# Patient Record
Sex: MALE | Race: WHITE | NOT HISPANIC OR LATINO | Employment: FULL TIME | URBAN - NONMETROPOLITAN AREA
[De-identification: names, ages, dates, MRNs, and addresses within clinical notes are randomized per-mention and may not be internally consistent; named-entity substitution may affect disease eponyms.]

---

## 2018-06-10 ENCOUNTER — HOSPITAL ENCOUNTER (EMERGENCY)
Facility: HOSPITAL | Age: 33
Discharge: HOME | End: 2018-06-10
Attending: EMERGENCY MEDICINE | Admitting: EMERGENCY MEDICINE

## 2018-06-10 ENCOUNTER — APPOINTMENT (OUTPATIENT)
Dept: CT IMAGING | Facility: HOSPITAL | Age: 33
End: 2018-06-10

## 2018-06-10 VITALS
BODY MASS INDEX: 36.06 KG/M2 | TEMPERATURE: 98.5 F | DIASTOLIC BLOOD PRESSURE: 82 MMHG | WEIGHT: 266.2 LBS | HEIGHT: 72 IN | RESPIRATION RATE: 18 BRPM | SYSTOLIC BLOOD PRESSURE: 128 MMHG | OXYGEN SATURATION: 98 % | HEART RATE: 71 BPM

## 2018-06-10 DIAGNOSIS — R10.84 GENERALIZED ABDOMINAL PAIN: Primary | ICD-10-CM

## 2018-06-10 DIAGNOSIS — K62.5 RECTAL BLEEDING: ICD-10-CM

## 2018-06-10 LAB
ALBUMIN SERPL-MCNC: 4.4 G/DL (ref 3.5–5)
ALBUMIN/GLOB SERPL: 1.5 G/DL (ref 1–2)
ALP SERPL-CCNC: 52 U/L (ref 38–126)
ALT SERPL W P-5'-P-CCNC: 47 U/L (ref 13–69)
ANION GAP SERPL CALCULATED.3IONS-SCNC: 15.7 MMOL/L (ref 10–20)
AST SERPL-CCNC: 32 U/L (ref 15–46)
BASOPHILS # BLD AUTO: 0.04 10*3/MM3 (ref 0–0.2)
BASOPHILS NFR BLD AUTO: 0.4 % (ref 0–2.5)
BILIRUB SERPL-MCNC: 0.5 MG/DL (ref 0.2–1.3)
BILIRUB UR QL STRIP: NEGATIVE
BUN BLD-MCNC: 14 MG/DL (ref 7–20)
BUN/CREAT SERPL: 11.7 (ref 6.3–21.9)
CALCIUM SPEC-SCNC: 9.6 MG/DL (ref 8.4–10.2)
CHLORIDE SERPL-SCNC: 102 MMOL/L (ref 98–107)
CLARITY UR: CLEAR
CO2 SERPL-SCNC: 29 MMOL/L (ref 26–30)
COLOR UR: YELLOW
CREAT BLD-MCNC: 1.2 MG/DL (ref 0.6–1.3)
DEPRECATED RDW RBC AUTO: 40.4 FL (ref 37–54)
EOSINOPHIL # BLD AUTO: 0.23 10*3/MM3 (ref 0–0.7)
EOSINOPHIL NFR BLD AUTO: 2.3 % (ref 0–7)
ERYTHROCYTE [DISTWIDTH] IN BLOOD BY AUTOMATED COUNT: 12 % (ref 11.5–14.5)
GFR SERPL CREATININE-BSD FRML MDRD: 70 ML/MIN/1.73
GLOBULIN UR ELPH-MCNC: 2.9 GM/DL
GLUCOSE BLD-MCNC: 95 MG/DL (ref 74–98)
GLUCOSE UR STRIP-MCNC: NEGATIVE MG/DL
HCT VFR BLD AUTO: 42.2 % (ref 42–52)
HGB BLD-MCNC: 15 G/DL (ref 14–18)
HGB UR QL STRIP.AUTO: NEGATIVE
HOLD SPECIMEN: NORMAL
HOLD SPECIMEN: NORMAL
IMM GRANULOCYTES # BLD: 0.02 10*3/MM3 (ref 0–0.06)
IMM GRANULOCYTES NFR BLD: 0.2 % (ref 0–0.6)
KETONES UR QL STRIP: NEGATIVE
LEUKOCYTE ESTERASE UR QL STRIP.AUTO: NEGATIVE
LIPASE SERPL-CCNC: 84 U/L (ref 23–300)
LYMPHOCYTES # BLD AUTO: 3.88 10*3/MM3 (ref 0.6–3.4)
LYMPHOCYTES NFR BLD AUTO: 38.8 % (ref 10–50)
MCH RBC QN AUTO: 32.6 PG (ref 27–31)
MCHC RBC AUTO-ENTMCNC: 35.5 G/DL (ref 30–37)
MCV RBC AUTO: 91.7 FL (ref 80–94)
MONOCYTES # BLD AUTO: 1.06 10*3/MM3 (ref 0–0.9)
MONOCYTES NFR BLD AUTO: 10.6 % (ref 0–12)
NEUTROPHILS # BLD AUTO: 4.78 10*3/MM3 (ref 2–6.9)
NEUTROPHILS NFR BLD AUTO: 47.7 % (ref 37–80)
NITRITE UR QL STRIP: NEGATIVE
NRBC BLD MANUAL-RTO: 0 /100 WBC (ref 0–0)
PH UR STRIP.AUTO: 6 [PH] (ref 5–8)
PLATELET # BLD AUTO: 305 10*3/MM3 (ref 130–400)
PMV BLD AUTO: 9.9 FL (ref 6–12)
POTASSIUM BLD-SCNC: 3.7 MMOL/L (ref 3.5–5.1)
PROT SERPL-MCNC: 7.3 G/DL (ref 6.3–8.2)
PROT UR QL STRIP: NEGATIVE
RBC # BLD AUTO: 4.6 10*6/MM3 (ref 4.7–6.1)
SODIUM BLD-SCNC: 143 MMOL/L (ref 137–145)
SP GR UR STRIP: 1.02 (ref 1–1.03)
TROPONIN I SERPL-MCNC: <0.012 NG/ML (ref 0–0.03)
UROBILINOGEN UR QL STRIP: ABNORMAL
WBC NRBC COR # BLD: 10.01 10*3/MM3 (ref 4.8–10.8)
WHOLE BLOOD HOLD SPECIMEN: NORMAL
WHOLE BLOOD HOLD SPECIMEN: NORMAL

## 2018-06-10 PROCEDURE — 84484 ASSAY OF TROPONIN QUANT: CPT | Performed by: EMERGENCY MEDICINE

## 2018-06-10 PROCEDURE — 85025 COMPLETE CBC W/AUTO DIFF WBC: CPT | Performed by: EMERGENCY MEDICINE

## 2018-06-10 PROCEDURE — 81003 URINALYSIS AUTO W/O SCOPE: CPT | Performed by: EMERGENCY MEDICINE

## 2018-06-10 PROCEDURE — 0 DIATRIZOATE MEGLUMINE & SODIUM PER 1 ML: Performed by: EMERGENCY MEDICINE

## 2018-06-10 PROCEDURE — 80053 COMPREHEN METABOLIC PANEL: CPT | Performed by: EMERGENCY MEDICINE

## 2018-06-10 PROCEDURE — 93005 ELECTROCARDIOGRAM TRACING: CPT | Performed by: EMERGENCY MEDICINE

## 2018-06-10 PROCEDURE — 25010000002 IOPAMIDOL 61 % SOLUTION: Performed by: EMERGENCY MEDICINE

## 2018-06-10 PROCEDURE — 99284 EMERGENCY DEPT VISIT MOD MDM: CPT

## 2018-06-10 PROCEDURE — 83690 ASSAY OF LIPASE: CPT | Performed by: EMERGENCY MEDICINE

## 2018-06-10 PROCEDURE — 74177 CT ABD & PELVIS W/CONTRAST: CPT

## 2018-06-10 RX ORDER — NEBIVOLOL 10 MG/1
10 TABLET ORAL DAILY
COMMUNITY

## 2018-06-10 RX ORDER — PHENTERMINE HYDROCHLORIDE 37.5 MG/1
37.5 TABLET ORAL
COMMUNITY

## 2018-06-10 RX ADMIN — IOPAMIDOL 100 ML: 612 INJECTION, SOLUTION INTRAVENOUS at 03:21

## 2018-06-10 RX ADMIN — DIATRIZOATE MEGLUMINE AND DIATRIZOATE SODIUM 30 ML: 660; 100 LIQUID ORAL; RECTAL at 03:21

## 2018-06-10 RX ADMIN — SODIUM CHLORIDE 1000 ML: 9 INJECTION, SOLUTION INTRAVENOUS at 01:47

## 2018-06-16 ENCOUNTER — OFFICE VISIT (OUTPATIENT)
Dept: FAMILY MEDICINE CLINIC | Age: 33
End: 2018-06-16
Payer: MEDICAID

## 2018-06-16 VITALS
RESPIRATION RATE: 18 BRPM | WEIGHT: 264.8 LBS | DIASTOLIC BLOOD PRESSURE: 94 MMHG | SYSTOLIC BLOOD PRESSURE: 138 MMHG | BODY MASS INDEX: 35.09 KG/M2 | OXYGEN SATURATION: 98 % | HEIGHT: 73 IN | HEART RATE: 114 BPM

## 2018-06-16 DIAGNOSIS — M10.9 ACUTE GOUT OF LEFT FOOT, UNSPECIFIED CAUSE: ICD-10-CM

## 2018-06-16 DIAGNOSIS — Z13.220 LIPID SCREENING: ICD-10-CM

## 2018-06-16 DIAGNOSIS — I10 ESSENTIAL HYPERTENSION: ICD-10-CM

## 2018-06-16 DIAGNOSIS — M10.9 ACUTE GOUT OF LEFT ELBOW, UNSPECIFIED CAUSE: Primary | ICD-10-CM

## 2018-06-16 PROBLEM — M1A.9XX0 CHRONIC GOUT: Status: ACTIVE | Noted: 2018-06-16

## 2018-06-16 PROBLEM — M1A.9XX0 CHRONIC GOUT: Chronic | Status: ACTIVE | Noted: 2018-06-16

## 2018-06-16 PROBLEM — E66.09 OBESITY DUE TO EXCESS CALORIES: Status: ACTIVE | Noted: 2018-06-16

## 2018-06-16 PROBLEM — Z72.0 TOBACCO USE: Status: ACTIVE | Noted: 2018-06-16

## 2018-06-16 PROCEDURE — 99202 OFFICE O/P NEW SF 15 MIN: CPT | Performed by: NURSE PRACTITIONER

## 2018-06-16 PROCEDURE — 96372 THER/PROPH/DIAG INJ SC/IM: CPT | Performed by: NURSE PRACTITIONER

## 2018-06-16 RX ORDER — PHENTERMINE HYDROCHLORIDE 37.5 MG/1
37.5 TABLET ORAL DAILY
COMMUNITY
End: 2018-10-20

## 2018-06-16 RX ORDER — DEXAMETHASONE SODIUM PHOSPHATE 4 MG/ML
10 INJECTION, SOLUTION INTRA-ARTICULAR; INTRALESIONAL; INTRAMUSCULAR; INTRAVENOUS; SOFT TISSUE ONCE
Status: COMPLETED | OUTPATIENT
Start: 2018-06-16 | End: 2018-06-16

## 2018-06-16 RX ADMIN — DEXAMETHASONE SODIUM PHOSPHATE 10 MG: 4 INJECTION, SOLUTION INTRA-ARTICULAR; INTRALESIONAL; INTRAMUSCULAR; INTRAVENOUS; SOFT TISSUE at 11:56

## 2018-06-16 ASSESSMENT — ENCOUNTER SYMPTOMS
ABDOMINAL DISTENTION: 0
EYES NEGATIVE: 1
ABDOMINAL PAIN: 0

## 2018-06-24 ASSESSMENT — ENCOUNTER SYMPTOMS
ALLERGIC/IMMUNOLOGIC NEGATIVE: 1
CHEST TIGHTNESS: 0
CHOKING: 0
SHORTNESS OF BREATH: 0

## 2018-07-06 ENCOUNTER — OFFICE VISIT (OUTPATIENT)
Dept: SURGERY | Facility: CLINIC | Age: 33
End: 2018-07-06

## 2018-07-06 VITALS
TEMPERATURE: 97.7 F | WEIGHT: 267.8 LBS | HEIGHT: 72 IN | DIASTOLIC BLOOD PRESSURE: 98 MMHG | BODY MASS INDEX: 36.27 KG/M2 | SYSTOLIC BLOOD PRESSURE: 132 MMHG | HEART RATE: 103 BPM | OXYGEN SATURATION: 98 %

## 2018-07-06 DIAGNOSIS — R10.11 RIGHT UPPER QUADRANT ABDOMINAL PAIN: ICD-10-CM

## 2018-07-06 DIAGNOSIS — K62.5 RECTAL BLEED: Primary | ICD-10-CM

## 2018-07-06 DIAGNOSIS — K52.9 CHRONIC DIARRHEA: ICD-10-CM

## 2018-07-06 PROCEDURE — 99204 OFFICE O/P NEW MOD 45 MIN: CPT | Performed by: SURGERY

## 2018-07-06 RX ORDER — CHOLESTYRAMINE 4 G/9G
1 POWDER, FOR SUSPENSION ORAL
Qty: 30 PACKET | Refills: 6 | Status: SHIPPED | OUTPATIENT
Start: 2018-07-06

## 2018-07-06 NOTE — PROGRESS NOTES
Patient: Jett Madrid    YOB: 1985    Date: 07/06/2018    Primary Care Provider: Arik Rodney DO    Chief Complaint   Patient presents with   • Rectal Bleeding       Subjective .     History of present illness:  I saw the patient in the office today as a consultation for evaluation and treatment of rectal bleeding described as BRBPR in the toilet bowl that occurred one time.  He has had RUQ pain for the past 4 years that radiates into his RLQ occasionally.  He has nausea, vomiting, and diarrhea alternating with constipation.  He had a ct scan that showed lymphadenopathy.  Patient had a negative gallbladder ultrasound and HIDA scan when he had pain with the Kinevac injection as well as nausea.  Patient has significant diarrhea after eating is concerned about dumping syndrome.  Recent CT scan was unremarkable.  No previous EGD or colonoscopy.    The following portions of the patient's history were reviewed and updated as appropriate: allergies, current medications, past family history, past medical history, past social history, past surgical history and problem list.      Review of Systems   Constitutional: Negative for chills, fever and unexpected weight change.   HENT: Negative for trouble swallowing and voice change.    Eyes: Negative for visual disturbance.   Respiratory: Negative for apnea, cough, chest tightness, shortness of breath and wheezing.    Cardiovascular: Negative for chest pain, palpitations and leg swelling.   Gastrointestinal: Positive for abdominal pain, constipation, diarrhea, nausea and vomiting. Negative for abdominal distention, anal bleeding, blood in stool and rectal pain.   Endocrine: Negative for cold intolerance and heat intolerance.   Genitourinary: Negative for difficulty urinating, dysuria, flank pain, scrotal swelling and testicular pain.   Musculoskeletal: Negative for back pain, gait problem and joint swelling.   Skin: Negative for color change, rash and wound.  "  Neurological: Negative for dizziness, syncope, speech difficulty, weakness, numbness and headaches.   Hematological: Negative for adenopathy. Does not bruise/bleed easily.   Psychiatric/Behavioral: Negative for confusion. The patient is not nervous/anxious.        History:  Past Medical History:   Diagnosis Date   • Hypertension    • Stomach pain     intermittent stomach pain for years but never saw a MD       Past Surgical History:   Procedure Laterality Date   • DENTAL PROCEDURE     • ELBOW PROCEDURE      left elbow sx   • TONSILLECTOMY         Family History   Problem Relation Age of Onset   • Diabetes Mother    • Diabetes Father    • Cancer Father    • Cancer Paternal Uncle        Social History   Substance Use Topics   • Smoking status: Never Smoker   • Smokeless tobacco: Current User     Types: Chew      Comment: 1 can of chew daily   • Alcohol use No       Allergies:  No Known Allergies    Medications:    Current Outpatient Prescriptions:   •  nebivolol (BYSTOLIC) 10 MG tablet, Take 10 mg by mouth Daily., Disp: , Rfl:   •  phentermine (ADIPEX-P) 37.5 MG tablet, Take 37.5 mg by mouth Every Morning Before Breakfast., Disp: , Rfl:     Objective     Vital Signs:   Vitals:    07/06/18 1240   BP: 132/98   Pulse: 103   Temp: 97.7 °F (36.5 °C)   TempSrc: Temporal Artery    SpO2: 98%   Weight: 121 kg (267 lb 12.8 oz)   Height: 182.9 cm (72\")       Physical Exam:   General Appearance:    Alert, cooperative, in no acute distress   Head:    Normocephalic, without obvious abnormality, atraumatic   Eyes:            Lids and lashes normal, conjunctivae and sclerae normal, no   icterus, no pallor, corneas clear, PERRL   Ears:    Ears appear intact with no abnormalities noted   Throat:   No oral lesions, no thrush, oral mucosa moist   Neck:   No adenopathy, supple, trachea midline, no thyromegaly,  no JVD   Lungs:     Clear to auscultation,respirations regular, even and                  unlabored    Heart:    Regular rhythm " and normal rate, normal S1 and S2, no            murmur   Abdomen:     no masses, no organomegaly, soft non-tender, non-distended, no guarding, there is no evidence of tenderness   Extremities:   Moves all extremities well, no edema, no cyanosis, no             redness   Pulses:   Pulses palpable and equal bilaterally   Skin:   No bleeding, bruising or rash   Lymph nodes:   No palpable adenopathy   Neurologic:   Cranial nerves 2 - 12 grossly intact, sensation intact      Results Review:   I reviewed the patient's new clinical results.    Review of Systems was reviewed and confirmed as accurate today.    Assessment/Plan :    1. Rectal bleed    2. Chronic diarrhea    3. Right upper quadrant abdominal pain        I recommend a colonoscopy And EGD for further evaluation. The procedure was explained as well as the risks which include but are not limited to bleeding, infection, perforation, abdominal pain etc. The patient understands these risks and the procedure and wishes to proceed.  If negative, may revisit his gallbladder issues and continue workup.  Also add Questran to limit his dumping symptoms and diarrhea.    Electronically signed by Sera Novoa MD  07/06/18 10:22 AM    Portions of this note have been scribed for Sera Novoa MD by Berkley Leigh. 7/6/2018  1:06 PM

## 2018-07-23 ENCOUNTER — TELEPHONE (OUTPATIENT)
Dept: SURGERY | Facility: CLINIC | Age: 33
End: 2018-07-23

## 2018-07-23 RX ORDER — POLYETHYLENE GLYCOL 3350 17 G/17G
255 POWDER, FOR SOLUTION ORAL ONCE
Qty: 15 PACKET | Refills: 0 | Status: SHIPPED | OUTPATIENT
Start: 2018-07-23 | End: 2018-07-23

## 2018-07-23 RX ORDER — BISACODYL 5 MG/1
TABLET, DELAYED RELEASE ORAL
Qty: 4 TABLET | Refills: 0 | Status: SHIPPED | OUTPATIENT
Start: 2018-07-23 | End: 2018-08-03

## 2018-07-23 NOTE — TELEPHONE ENCOUNTER
Called patient to confirm patient surgery with Dr Novoa  07/24/18 no answer, left voice message for patient to call office and confirm surgery.

## 2018-07-24 ENCOUNTER — OUTSIDE FACILITY SERVICE (OUTPATIENT)
Dept: SURGERY | Facility: CLINIC | Age: 33
End: 2018-07-24

## 2018-07-24 PROCEDURE — G0121 COLON CA SCRN NOT HI RSK IND: HCPCS | Performed by: SURGERY

## 2018-07-24 PROCEDURE — 43239 EGD BIOPSY SINGLE/MULTIPLE: CPT | Performed by: SURGERY

## 2018-08-02 ENCOUNTER — TELEPHONE (OUTPATIENT)
Dept: SURGERY | Facility: CLINIC | Age: 33
End: 2018-08-02

## 2018-08-03 ENCOUNTER — OFFICE VISIT (OUTPATIENT)
Dept: SURGERY | Facility: CLINIC | Age: 33
End: 2018-08-03

## 2018-08-03 VITALS
TEMPERATURE: 98.2 F | OXYGEN SATURATION: 99 % | DIASTOLIC BLOOD PRESSURE: 90 MMHG | HEIGHT: 72 IN | HEART RATE: 89 BPM | SYSTOLIC BLOOD PRESSURE: 130 MMHG | WEIGHT: 267 LBS | BODY MASS INDEX: 36.16 KG/M2

## 2018-08-03 DIAGNOSIS — K21.00 GASTROESOPHAGEAL REFLUX DISEASE WITH ESOPHAGITIS: ICD-10-CM

## 2018-08-03 DIAGNOSIS — R10.11 RUQ PAIN: Primary | ICD-10-CM

## 2018-08-03 PROCEDURE — 99213 OFFICE O/P EST LOW 20 MIN: CPT | Performed by: SURGERY

## 2018-08-03 RX ORDER — OMEPRAZOLE 40 MG/1
40 CAPSULE, DELAYED RELEASE ORAL DAILY
Qty: 30 CAPSULE | Refills: 11 | Status: SHIPPED | OUTPATIENT
Start: 2018-08-03

## 2018-08-03 NOTE — PROGRESS NOTES
Patient: Jett Madrid    YOB: 1985    Date: 08/03/2018    Primary Care Provider: Arik Rodney DO    Reason for Consultation: Follow-up EGD    Chief Complaint   Patient presents with   • Follow-up     follow up egd colon       History of present illness:  I saw the patient in the office today as a followup from their recent EGD/colonoscopy with biopsy, the pathology report did show minimal chronic inflammation,gastroesophageal reflux,eosinophils per high magnification field. They complain of a constant dull ache @ RUQ. Ultrasound today shows no gallstones, last HIDA scan was 3 years ago and was equivocal.  Patient has daily pain and discomfort with severe nausea and pain radiating to his back, pain at ×8 out of 10.  Patient also has significant inflammation and reflux, does not take PPI medication.    The following portions of the patient's history were reviewed and updated as appropriate: allergies, current medications, past family history, past medical history, past social history, past surgical history and problem list.      Review of Systems   Constitutional: Negative for chills, fever and unexpected weight change.   HENT: Negative for trouble swallowing and voice change.    Eyes: Negative for visual disturbance.   Respiratory: Negative for apnea, cough, chest tightness, shortness of breath and wheezing.    Cardiovascular: Negative for chest pain, palpitations and leg swelling.   Gastrointestinal: Negative for abdominal distention, abdominal pain, anal bleeding, blood in stool, constipation, diarrhea, nausea, rectal pain and vomiting.   Endocrine: Negative for cold intolerance and heat intolerance.   Genitourinary: Negative for difficulty urinating, dysuria, flank pain, scrotal swelling and testicular pain.   Musculoskeletal: Negative for back pain, gait problem and joint swelling.   Skin: Negative for color change, rash and wound.   Neurological: Negative for dizziness, syncope, speech  "difficulty, weakness, numbness and headaches.   Hematological: Negative for adenopathy. Does not bruise/bleed easily.   Psychiatric/Behavioral: Negative for confusion. The patient is not nervous/anxious.        Vital Signs:   Vitals:    08/03/18 1359   BP: 130/90   Pulse: 89   Temp: 98.2 °F (36.8 °C)   TempSrc: Temporal Artery    SpO2: 99%   Weight: 121 kg (267 lb)   Height: 182.9 cm (72\")       Allergies:  No Known Allergies    Medications:    Current Outpatient Prescriptions:   •  cholestyramine (QUESTRAN) 4 g packet, Take 1 packet by mouth 3 (Three) Times a Day With Meals., Disp: 30 packet, Rfl: 6  •  nebivolol (BYSTOLIC) 10 MG tablet, Take 10 mg by mouth Daily., Disp: , Rfl:   •  omeprazole (PRILOSEC) 40 MG capsule, Take 1 capsule by mouth Daily., Disp: 30 capsule, Rfl: 11  •  phentermine (ADIPEX-P) 37.5 MG tablet, Take 37.5 mg by mouth Every Morning Before Breakfast., Disp: , Rfl:     Physical Exam:   General Appearance:    Alert, cooperative, in no acute distress   Abdomen:     no masses, no organomegaly, soft non-tender, non-distended, no guarding, wounds are well healed, no evidence of recurrent hernia   Chest:      Clear toausculation            Cor:  Regular rate and rhythm      Results Review:   I reviewed the patient's new clinical results.    Assessment / Plan:    1. RUQ pain    2. Gastroesophageal reflux disease with esophagitis        I did discuss the situation with the patient today in the office and they have done well from their recent EGD with biopsy. I have told the patient We will start him on Prilosec daily, repeat EGD in 1 year.  Gallbladder ultrasound today was negative, he is scheduled for HIDA scan and follow-up in one week..    Electronically signed by Srea Novoa MD  08/03/18          Portions of this note have been scribed for Sera Novoa MD by Berkley Leigh. 8/3/2018  2:47 PM          "

## 2018-08-08 ENCOUNTER — TELEPHONE (OUTPATIENT)
Dept: SURGERY | Facility: CLINIC | Age: 33
End: 2018-08-08

## 2018-08-08 NOTE — TELEPHONE ENCOUNTER
Patient called me back stated he thought his hida scan was 08/09/18 inflammatory bowel disease told him that it was scheduled for yesterday. He asks if I could get it rescheduled and if possible for tomorrow. Called [facility] and   rescheduled hida scan for 08/09/18 @ 8:30 am. Called patient no answer. Called patient wife and she said she will give patient message. Patient had asks if he could be seen in [place] next Friday .

## 2018-08-08 NOTE — TELEPHONE ENCOUNTER
Called the patient to remind them of an upcoming appointment with general surgery. I was unable to reach to reach the patient.Had leave message.

## 2018-08-09 ENCOUNTER — HOSPITAL ENCOUNTER (OUTPATIENT)
Dept: NUCLEAR MEDICINE | Facility: HOSPITAL | Age: 33
Discharge: HOME | End: 2018-08-09
Payer: MEDICAID

## 2018-08-09 DIAGNOSIS — R10.11 ABDOMINAL PAIN, RIGHT UPPER QUADRANT: ICD-10-CM

## 2018-08-09 PROCEDURE — 3430000000 HC RX DIAGNOSTIC RADIOPHARMACEUTICAL: Performed by: SURGERY

## 2018-08-09 PROCEDURE — 6360000004 HC RX CONTRAST MEDICATION: Performed by: SURGERY

## 2018-08-09 PROCEDURE — A9537 TC99M MEBROFENIN: HCPCS | Performed by: SURGERY

## 2018-08-09 PROCEDURE — 78227 HEPATOBIL SYST IMAGE W/DRUG: CPT

## 2018-08-09 RX ADMIN — SINCALIDE 2.36 MCG: 5 INJECTION, POWDER, LYOPHILIZED, FOR SOLUTION INTRAVENOUS at 10:02

## 2018-08-09 RX ADMIN — MEBROFENIN 6.7 MILLICURIE: 45 INJECTION, POWDER, LYOPHILIZED, FOR SOLUTION INTRAVENOUS at 09:17

## 2018-08-16 ENCOUNTER — TELEPHONE (OUTPATIENT)
Dept: SURGERY | Facility: CLINIC | Age: 33
End: 2018-08-16

## 2018-08-16 NOTE — TELEPHONE ENCOUNTER
Called the patient to remind them of an upcoming appointment with general surgery. I was able to reach to reach the patient. Left message

## 2018-08-23 ENCOUNTER — OFFICE VISIT (OUTPATIENT)
Dept: SURGERY | Facility: CLINIC | Age: 33
End: 2018-08-23

## 2018-08-23 VITALS
TEMPERATURE: 97.2 F | HEART RATE: 99 BPM | BODY MASS INDEX: 35.99 KG/M2 | SYSTOLIC BLOOD PRESSURE: 120 MMHG | OXYGEN SATURATION: 99 % | DIASTOLIC BLOOD PRESSURE: 88 MMHG | HEIGHT: 72 IN | WEIGHT: 265.7 LBS

## 2018-08-23 DIAGNOSIS — R10.11 RIGHT UPPER QUADRANT ABDOMINAL PAIN: Primary | ICD-10-CM

## 2018-08-23 DIAGNOSIS — K21.00 GASTROESOPHAGEAL REFLUX DISEASE WITH ESOPHAGITIS: ICD-10-CM

## 2018-08-23 PROCEDURE — 99212 OFFICE O/P EST SF 10 MIN: CPT | Performed by: SURGERY

## 2018-08-23 NOTE — PROGRESS NOTES
Patient: Jett Madrid    YOB: 1985    Date: 08/23/2018    Primary Care Provider: Arik Rodney DO    Chief Complaint   Patient presents with   • Abdominal Pain     ruq pain       History: Patient is here for a follow up visit after a hida scan. The ejection fraction was normal. The patient denies reproduction of symptoms with the hida scan. He does complain of some ruq pain but denies nausea or vomiting. He said that he believes that the Prilosec has helped with the pain some.  Patient still has significant reflux, on PPI medication with minimal relief.  Still has breakthrough reflux symptoms.  No pain with Kinevac demonstrated about patient.  Gallbladder was appears to be unremarkable.    The following portions of the patient's history were reviewed and updated as appropriate: allergies, current medications, past family history, past medical history, past social history, past surgical history and problem list.      Review of Systems   Constitutional: Negative for chills, fever and unexpected weight change.   HENT: Negative for trouble swallowing and voice change.    Eyes: Negative for visual disturbance.   Respiratory: Negative for apnea, cough, chest tightness, shortness of breath and wheezing.    Cardiovascular: Negative for chest pain, palpitations and leg swelling.   Gastrointestinal: Positive for abdominal pain. Negative for abdominal distention, anal bleeding, blood in stool, constipation, diarrhea, nausea, rectal pain and vomiting.   Endocrine: Negative for cold intolerance and heat intolerance.   Genitourinary: Negative for difficulty urinating, dysuria, flank pain, scrotal swelling and testicular pain.   Musculoskeletal: Negative for back pain, gait problem and joint swelling.   Skin: Negative for color change, rash and wound.   Neurological: Negative for dizziness, syncope, speech difficulty, weakness, numbness and headaches.   Hematological: Negative for adenopathy. Does not  "bruise/bleed easily.   Psychiatric/Behavioral: Negative for confusion. The patient is not nervous/anxious.        Vital Signs  Vitals:    08/23/18 0816   BP: 120/88   Pulse: 99   Temp: 97.2 °F (36.2 °C)   SpO2: 99%   Weight: 121 kg (265 lb 11.2 oz)   Height: 182.9 cm (72\")       Allergies:  No Known Allergies    Medications:    Current Outpatient Prescriptions:   •  cholestyramine (QUESTRAN) 4 g packet, Take 1 packet by mouth 3 (Three) Times a Day With Meals., Disp: 30 packet, Rfl: 6  •  nebivolol (BYSTOLIC) 10 MG tablet, Take 10 mg by mouth Daily., Disp: , Rfl:   •  omeprazole (PRILOSEC) 40 MG capsule, Take 1 capsule by mouth Daily., Disp: 30 capsule, Rfl: 11  •  phentermine (ADIPEX-P) 37.5 MG tablet, Take 37.5 mg by mouth Every Morning Before Breakfast., Disp: , Rfl:     Physical Exam:   General Appearance:    Alert, cooperative, in no acute distress   Head:    Normocephalic, without obvious abnormality, atraumatic   Lungs:     Clear to auscultation,respirations regular, even and                  unlabored    Heart:    Regular rhythm and normal rate, normal S1 and S2, no            murmur, no gallop, no rub, no click   Abdomen:     Normal bowel sounds, no masses, no organomegaly, soft        non-tender, non-distended, no guarding, no rebound                tenderness   Extremities:   Moves all extremities well, no edema, no cyanosis, no             redness   Pulses:   Pulses palpable and equal bilaterally   Skin:   No bleeding, bruising or rash       Results Review:   I reviewed the patient's new clinical results.     Assessment/Plan     1. Right upper quadrant abdominal pain    2. Gastroesophageal reflux disease with esophagitis      Recommend a low-fat diet, continue PPI medication and avoid caffeine and nicotine.  Repeat EGD in 5 years.  Electronically signed by Sera Novoa MD  08/23/18    Portions of this note have been scribed for Sera Novoa MD by Shira Rodriguez CMA 8/23/2018  11:54 AM      "

## 2018-10-20 ENCOUNTER — OFFICE VISIT (OUTPATIENT)
Dept: FAMILY MEDICINE CLINIC | Age: 33
End: 2018-10-20
Payer: MEDICAID

## 2018-10-20 ENCOUNTER — HOSPITAL ENCOUNTER (OUTPATIENT)
Facility: HOSPITAL | Age: 33
Discharge: HOME | End: 2018-10-20
Payer: MEDICAID

## 2018-10-20 VITALS
BODY MASS INDEX: 33.94 KG/M2 | HEIGHT: 73 IN | WEIGHT: 256.1 LBS | OXYGEN SATURATION: 98 % | RESPIRATION RATE: 18 BRPM | DIASTOLIC BLOOD PRESSURE: 84 MMHG | SYSTOLIC BLOOD PRESSURE: 120 MMHG | HEART RATE: 78 BPM

## 2018-10-20 DIAGNOSIS — M10.9 ACUTE GOUT OF FOOT, UNSPECIFIED CAUSE, UNSPECIFIED LATERALITY: Primary | ICD-10-CM

## 2018-10-20 LAB
A/G RATIO: 1.6 (ref 0.8–2)
ALBUMIN SERPL-MCNC: 4.7 G/DL (ref 3.4–4.8)
ALP BLD-CCNC: 52 U/L (ref 25–100)
ALT SERPL-CCNC: 54 U/L (ref 4–36)
ANION GAP SERPL CALCULATED.3IONS-SCNC: 13 MMOL/L (ref 3–16)
AST SERPL-CCNC: 36 U/L (ref 8–33)
BILIRUB SERPL-MCNC: 0.6 MG/DL (ref 0.3–1.2)
BUN BLDV-MCNC: 13 MG/DL (ref 6–20)
CALCIUM SERPL-MCNC: 9.6 MG/DL (ref 8.5–10.5)
CHLORIDE BLD-SCNC: 101 MMOL/L (ref 98–107)
CO2: 24 MMOL/L (ref 20–30)
CREAT SERPL-MCNC: 1.1 MG/DL (ref 0.4–1.2)
GFR AFRICAN AMERICAN: >59
GFR NON-AFRICAN AMERICAN: >59
GLOBULIN: 3 G/DL
GLUCOSE BLD-MCNC: 102 MG/DL (ref 74–106)
POTASSIUM SERPL-SCNC: 4.3 MMOL/L (ref 3.4–5.1)
SODIUM BLD-SCNC: 138 MMOL/L (ref 136–145)
TOTAL PROTEIN: 7.7 G/DL (ref 6.4–8.3)
URIC ACID, SERUM: 10.4 MG/DL (ref 3.7–8.6)

## 2018-10-20 PROCEDURE — 84550 ASSAY OF BLOOD/URIC ACID: CPT

## 2018-10-20 PROCEDURE — 99213 OFFICE O/P EST LOW 20 MIN: CPT | Performed by: NURSE PRACTITIONER

## 2018-10-20 PROCEDURE — 80053 COMPREHEN METABOLIC PANEL: CPT

## 2018-10-20 PROCEDURE — 36415 COLL VENOUS BLD VENIPUNCTURE: CPT

## 2018-10-20 RX ORDER — DEXAMETHASONE SODIUM PHOSPHATE 4 MG/ML
10 INJECTION, SOLUTION INTRA-ARTICULAR; INTRALESIONAL; INTRAMUSCULAR; INTRAVENOUS; SOFT TISSUE ONCE
Status: COMPLETED | OUTPATIENT
Start: 2018-10-20 | End: 2018-10-20

## 2018-10-20 RX ORDER — OMEPRAZOLE 40 MG/1
40 CAPSULE, DELAYED RELEASE ORAL
COMMUNITY
Start: 2018-08-03 | End: 2019-04-27

## 2018-10-20 RX ORDER — MELOXICAM 15 MG/1
TABLET ORAL
Refills: 0 | COMMUNITY
Start: 2018-08-24 | End: 2018-12-26

## 2018-10-20 RX ORDER — COLCHICINE 0.6 MG/1
TABLET ORAL
Qty: 6 TABLET | Refills: 3 | Status: SHIPPED | OUTPATIENT
Start: 2018-10-20 | End: 2018-12-19

## 2018-10-20 RX ORDER — COLCHICINE 0.6 MG/1
TABLET ORAL
Qty: 6 TABLET | Refills: 3 | Status: SHIPPED | OUTPATIENT
Start: 2018-10-20 | End: 2018-10-20 | Stop reason: SDUPTHER

## 2018-10-20 RX ADMIN — DEXAMETHASONE SODIUM PHOSPHATE 10 MG: 4 INJECTION, SOLUTION INTRA-ARTICULAR; INTRALESIONAL; INTRAMUSCULAR; INTRAVENOUS; SOFT TISSUE at 10:57

## 2018-10-22 ASSESSMENT — ENCOUNTER SYMPTOMS
GASTROINTESTINAL NEGATIVE: 1
EYES NEGATIVE: 1
ALLERGIC/IMMUNOLOGIC NEGATIVE: 1
RESPIRATORY NEGATIVE: 1

## 2018-10-22 NOTE — PATIENT INSTRUCTIONS
\"crash diet. \" Losing a lot of weight in a short amount of time can cause a gout attack. When should you call for help? Call your doctor now or seek immediate medical care if:    · You have a fever.     · The joint is so painful you cannot use it.     · You have sudden, unexplained swelling, redness, warmth, or severe pain in one or more joints.    Watch closely for changes in your health, and be sure to contact your doctor if:    · You have joint pain.     · Your symptoms get worse or are not improving after 2 or 3 days. Where can you learn more? Go to https://[email or web address]-nariopepiceweb.[email or web address] org and sign in to your [employer] account. Enter R317 in the [employer] box to learn more about \"Gout: Care Instructions. \"     If you do not have an account, please click on the \"Sign Up Now\" link. Current as of: October 10, 2017  Content Version: 11.7  © 9500-2865 [employer]. Care instructions adapted under license by [facility] ([facility]). If you have questions about a medical condition or this instruction, always ask your healthcare professional. [place] [address] any warranty or liability for your use of this information. Patient Education        Purine-Restricted Diet: Care Instructions  Your Care Instructions    Purines are substances that are found in some foods. Your body turns purines into uric acid. High levels of uric acid can cause gout, which is a form of arthritis that causes pain and inflammation in joints. You may be able to help control the amount of uric acid in your body by limiting high-purine foods in your diet. Follow-up care is a key part of your treatment and safety. Be sure to make and go to all appointments, and call your doctor if you are having problems. It's also a good idea to know your test results and keep a list of the medicines you take. How can you care for yourself at home?   · Plan your meals and snacks around foods that are low in purines and are safe for you to eat. These foods include:  ¨ Green vegetables and tomatoes. ¨ Fruits. ¨ Whole-grain breads, rice, and cereals. ¨ Eggs, peanut butter, and nuts. ¨ Low-fat milk, cheese, and other milk products. ¨ Popcorn. ¨ Gelatin desserts, chocolate, cocoa, and cakes and sweets, in small amounts. · You can eat certain foods that are medium-high in purines, but eat them only once in a while. These foods include:  ¨ Legumes, such as dried beans and dried peas. You can have 1 cup cooked legumes each day. ¨ Asparagus, cauliflower, spinach, mushrooms, and green peas. ¨ Fish and seafood (other than very high-purine seafood). ¨ Oatmeal, wheat bran, and wheat germ. · Limit very high-purine foods, including:  ¨ Organ meats, such as liver, kidneys, sweetbreads, and brains. ¨ Meats, including aceves, beef, pork, and lamb. ¨ Game meats and any other meats in large amounts. ¨ Anchovies, sardines, herring, mackerel, and scallops. ¨ Gravy. ¨ Beer. Where can you learn more? Go to https://Tytanium Ideas.Game [email or web address] org and sign in to your [employer] account. Enter F448 in the [facility] box to learn more about \"Purine-Restricted Diet: Care Instructions. \"     If you do not have an account, please click on the \"Sign Up Now\" link. Current as of: May 12, 2017  Content Version: 11.7  © 8807-6294 [employer]. Care instructions adapted under license by [facility] ([facility]). If you have questions about a medical condition or this instruction, always ask your healthcare professional. [address] any warranty or liability for your use of this information. Patient Education          colchicine  Pronunciation:  NORTH childs  Brand:  Tyrell Arellano  What is the most important information I should know about colchicine? Serious drug interactions can occur when certain medicines are used together with colchicine.  Tell each of your healthcare providers about all medicines you use now, and any medicine you start or stop using. What is colchicine? Colchicine affects the way the body responds to uric acid crystals, which reduces swelling and pain. Because colchicine was developed prior to federal regulations requiring FDA review of all marketed drug products, not all uses for colchicine have been approved by the FDA. The Colcrys  brand of colchicine is FDA-approved to treat or prevent gout in adults, and to treat a genetic condition called Familial Mediterranean Fever in adults and children who are at least 3years old. The Mitigare brand of colchicine is FDA-approved to prevent gout flares in adults. Generic forms of colchicine have been used to treat or prevent attacks of gout, or to treat symptoms of Behcets syndrome (such as swelling, redness, warmth, and pain). Colchicine is not a cure for gouty arthritis or Behcets syndrome, and it will not prevent these diseases from progressing. Colchicine should not be used as a routine pain medication for other conditions. Colchicine may also be used for purposes not listed in this medication guide. What should I discuss with my healthcare provider before taking colchicine? You should not use colchicine if you are allergic to it. Some medicines can cause unwanted or dangerous effects when used with colchicine, especially if you have liver or kidney disease. Your doctor may need to change your treatment plan if you use any of the following drugs:  · cyclosporine;  · nefazodone;  · tipranavir;  · clarithromycin or telithromycin;  · itraconazole or ketoconazole; or  · HIV or AIDS medicine --atazanavir, darunavir, fosamprenavir, indinavir, lopinavir, nelfinavir, ritonavir, or saquinavir. To make sure colchicine is safe for you, tell your doctor if you have:  · liver disease;  · kidney disease; or  · if you take digoxin, or cholesterol-lowering medications. It is not known whether this medicine will harm an unborn baby.  Tell your doctor if you should I avoid while taking colchicine? Grapefruit and grapefruit juice may interact with colchicine and lead to unwanted side effects. Avoid the use of grapefruit products while taking colchicine. What are the possible side effects of colchicine? Get emergency medical help if you have signs of an allergic reaction: hives; difficult breathing; swelling of your face, lips, tongue, or throat. Call your doctor at once if you have:  · muscle pain or weakness;  · numbness or tingly feeling in your fingers or toes;  · pale or gray appearance of your lips, tongue, or hands;  · severe or ongoing vomiting or diarrhea;  · fever, chills, body aches, flu symptoms; or  · easy bruising, unusual bleeding, feeling weak or tired. Common side effects may include:  · nausea, vomiting, stomach pain; or  · diarrhea. This is not a complete list of side effects and others may occur. Call your doctor for medical advice about side effects. You may report side effects to FDA at 0-972-FDA-6760. What other drugs will affect colchicine? Many drugs can interact with colchicine, and some drugs should not be used together. This includes prescription and over-the-counter medicines, vitamins, and herbal products. Not all possible interactions are listed in this medication guide. Tell your doctor about all medicines you use, and those you start or stop using during your treatment with colchicine. Give a list of all your medicines to any healthcare provider who treats you. Where can I get more information? Your pharmacist can provide more information about colchicine. Remember, keep this and all other medicines out of the reach of children, never share your medicines with others, and use this medication only for the indication prescribed. Every effort has been made to ensure that the information provided by [address] is accurate, up-to-date, and complete, but no guarantee is made to that effect.  Drug information

## 2018-12-19 ENCOUNTER — HOSPITAL ENCOUNTER (OUTPATIENT)
Facility: HOSPITAL | Age: 33
Discharge: HOME | End: 2018-12-19
Payer: MEDICAID

## 2018-12-19 ENCOUNTER — OFFICE VISIT (OUTPATIENT)
Dept: FAMILY MEDICINE CLINIC | Age: 33
End: 2018-12-19
Payer: MEDICAID

## 2018-12-19 VITALS
BODY MASS INDEX: 34.33 KG/M2 | RESPIRATION RATE: 18 BRPM | WEIGHT: 259 LBS | DIASTOLIC BLOOD PRESSURE: 87 MMHG | OXYGEN SATURATION: 98 % | HEIGHT: 73 IN | SYSTOLIC BLOOD PRESSURE: 139 MMHG | HEART RATE: 68 BPM

## 2018-12-19 DIAGNOSIS — R53.83 FATIGUE, UNSPECIFIED TYPE: ICD-10-CM

## 2018-12-19 DIAGNOSIS — Z13.1 SCREENING FOR DIABETES MELLITUS (DM): ICD-10-CM

## 2018-12-19 DIAGNOSIS — Z13.21 ENCOUNTER FOR VITAMIN DEFICIENCY SCREENING: ICD-10-CM

## 2018-12-19 DIAGNOSIS — I10 ESSENTIAL HYPERTENSION: Primary | ICD-10-CM

## 2018-12-19 DIAGNOSIS — Z11.4 SCREENING FOR HIV (HUMAN IMMUNODEFICIENCY VIRUS): ICD-10-CM

## 2018-12-19 DIAGNOSIS — I10 ESSENTIAL HYPERTENSION: ICD-10-CM

## 2018-12-19 DIAGNOSIS — M10.9 GOUT OF FOOT, UNSPECIFIED CAUSE, UNSPECIFIED CHRONICITY, UNSPECIFIED LATERALITY: ICD-10-CM

## 2018-12-19 DIAGNOSIS — F41.1 GAD (GENERALIZED ANXIETY DISORDER): ICD-10-CM

## 2018-12-19 DIAGNOSIS — M54.5 ACUTE LOW BACK PAIN, UNSPECIFIED BACK PAIN LATERALITY, WITH SCIATICA PRESENCE UNSPECIFIED: ICD-10-CM

## 2018-12-19 LAB
A/G RATIO: 2.2 (ref 0.8–2)
ALBUMIN SERPL-MCNC: 4.7 G/DL (ref 3.4–4.8)
ALP BLD-CCNC: 54 U/L (ref 25–100)
ALT SERPL-CCNC: 21 U/L (ref 4–36)
ANION GAP SERPL CALCULATED.3IONS-SCNC: 11 MMOL/L (ref 3–16)
AST SERPL-CCNC: 18 U/L (ref 8–33)
BASOPHILS ABSOLUTE: 0 K/UL (ref 0–0.1)
BASOPHILS RELATIVE PERCENT: 0.4 %
BILIRUB SERPL-MCNC: 0.7 MG/DL (ref 0.3–1.2)
BILIRUBIN, POC: ABNORMAL
BLOOD URINE, POC: ABNORMAL
BUN BLDV-MCNC: 10 MG/DL (ref 6–20)
CALCIUM SERPL-MCNC: 9.8 MG/DL (ref 8.5–10.5)
CHLORIDE BLD-SCNC: 103 MMOL/L (ref 98–107)
CHOLESTEROL, TOTAL: 187 MG/DL (ref 0–200)
CLARITY, POC: ABNORMAL
CO2: 28 MMOL/L (ref 20–30)
COLOR, POC: ABNORMAL
CREAT SERPL-MCNC: 1 MG/DL (ref 0.4–1.2)
EOSINOPHILS ABSOLUTE: 0.2 K/UL (ref 0–0.4)
EOSINOPHILS RELATIVE PERCENT: 2.1 %
FOLATE: 11 NG/ML
GFR AFRICAN AMERICAN: >59
GFR NON-AFRICAN AMERICAN: >59
GLOBULIN: 2.1 G/DL
GLUCOSE BLD-MCNC: 89 MG/DL (ref 74–106)
GLUCOSE URINE, POC: ABNORMAL
HBA1C MFR BLD: 4.8 %
HCT VFR BLD CALC: 45.7 % (ref 40–54)
HDLC SERPL-MCNC: 48 MG/DL (ref 40–60)
HEMOGLOBIN: 15 G/DL (ref 13–18)
IMMATURE GRANULOCYTES #: 0 K/UL
IMMATURE GRANULOCYTES %: 0.3 % (ref 0–5)
KETONES, POC: ABNORMAL
LDL CHOLESTEROL CALCULATED: 123 MG/DL
LEUKOCYTE EST, POC: ABNORMAL
LYMPHOCYTES ABSOLUTE: 2.2 K/UL (ref 1.5–4)
LYMPHOCYTES RELATIVE PERCENT: 23.9 %
MCH RBC QN AUTO: 31.1 PG (ref 27–32)
MCHC RBC AUTO-ENTMCNC: 32.8 G/DL (ref 31–35)
MCV RBC AUTO: 94.6 FL (ref 80–100)
MONOCYTES ABSOLUTE: 0.7 K/UL (ref 0.2–0.8)
MONOCYTES RELATIVE PERCENT: 7.1 %
NEUTROPHILS ABSOLUTE: 6.1 K/UL (ref 2–7.5)
NEUTROPHILS RELATIVE PERCENT: 66.2 %
NITRITE, POC: ABNORMAL
PDW BLD-RTO: 12.1 % (ref 11–16)
PH, POC: 5
PLATELET # BLD: 312 K/UL (ref 150–400)
PMV BLD AUTO: 10.6 FL (ref 6–10)
POTASSIUM SERPL-SCNC: 4.1 MMOL/L (ref 3.4–5.1)
PROTEIN, POC: ABNORMAL
RBC # BLD: 4.83 M/UL (ref 4.5–6)
SODIUM BLD-SCNC: 142 MMOL/L (ref 136–145)
SPECIFIC GRAVITY, POC: 1.01
TOTAL PROTEIN: 6.8 G/DL (ref 6.4–8.3)
TRIGL SERPL-MCNC: 81 MG/DL (ref 0–249)
TSH REFLEX: 2.62 UIU/ML (ref 0.35–5.5)
URIC ACID, SERUM: 8.1 MG/DL (ref 3.7–8.6)
UROBILINOGEN, POC: 1
VITAMIN B-12: 415 PG/ML (ref 211–911)
VITAMIN D 25-HYDROXY: 28.6 (ref 32–100)
VLDLC SERPL CALC-MCNC: 16 MG/DL
WBC # BLD: 9.2 K/UL (ref 4–11)

## 2018-12-19 PROCEDURE — 87390 HIV-1 AG IA: CPT

## 2018-12-19 PROCEDURE — 36415 COLL VENOUS BLD VENIPUNCTURE: CPT

## 2018-12-19 PROCEDURE — 85025 COMPLETE CBC W/AUTO DIFF WBC: CPT

## 2018-12-19 PROCEDURE — 82746 ASSAY OF FOLIC ACID SERUM: CPT

## 2018-12-19 PROCEDURE — 84443 ASSAY THYROID STIM HORMONE: CPT

## 2018-12-19 PROCEDURE — 86701 HIV-1ANTIBODY: CPT

## 2018-12-19 PROCEDURE — 86702 HIV-2 ANTIBODY: CPT

## 2018-12-19 PROCEDURE — 84403 ASSAY OF TOTAL TESTOSTERONE: CPT

## 2018-12-19 PROCEDURE — 84270 ASSAY OF SEX HORMONE GLOBUL: CPT

## 2018-12-19 PROCEDURE — 83036 HEMOGLOBIN GLYCOSYLATED A1C: CPT

## 2018-12-19 PROCEDURE — 82607 VITAMIN B-12: CPT

## 2018-12-19 PROCEDURE — 84550 ASSAY OF BLOOD/URIC ACID: CPT

## 2018-12-19 PROCEDURE — 81002 URINALYSIS NONAUTO W/O SCOPE: CPT | Performed by: NURSE PRACTITIONER

## 2018-12-19 PROCEDURE — 82306 VITAMIN D 25 HYDROXY: CPT

## 2018-12-19 PROCEDURE — 99202 OFFICE O/P NEW SF 15 MIN: CPT | Performed by: NURSE PRACTITIONER

## 2018-12-19 PROCEDURE — 80061 LIPID PANEL: CPT

## 2018-12-19 PROCEDURE — 80053 COMPREHEN METABOLIC PANEL: CPT

## 2018-12-19 ASSESSMENT — ENCOUNTER SYMPTOMS
CHEST TIGHTNESS: 0
EYE PAIN: 0
TROUBLE SWALLOWING: 0
DIARRHEA: 0
SHORTNESS OF BREATH: 0
NAUSEA: 0
COUGH: 0
EYE REDNESS: 0
BLURRED VISION: 0
ABDOMINAL PAIN: 0
SORE THROAT: 0
VOMITING: 0
ORTHOPNEA: 0

## 2018-12-19 ASSESSMENT — PATIENT HEALTH QUESTIONNAIRE - PHQ9
2. FEELING DOWN, DEPRESSED OR HOPELESS: 1
1. LITTLE INTEREST OR PLEASURE IN DOING THINGS: 1
SUM OF ALL RESPONSES TO PHQ QUESTIONS 1-9: 2
SUM OF ALL RESPONSES TO PHQ QUESTIONS 1-9: 2
SUM OF ALL RESPONSES TO PHQ9 QUESTIONS 1 & 2: 2

## 2018-12-19 NOTE — PATIENT INSTRUCTIONS
stretching and moving your entire body. Sometimes people fall asleep during relaxation, but they usually wake up shortly afterward. · Always give yourself time to return to full alertness before you drive a car or do anything that might cause an accident if you are not fully alert. Never play a relaxation tape while you drive a car. When should you call for help? Call 911 anytime you think you may need emergency care. For example, call if:    · You feel you cannot stop from hurting yourself or someone else.   [facility] Bias the numbers for these national suicide hotlines: 9-020-524-TALK (3-496.297.3160) and 6-918-AUSVEOZ (3-150.360.9120). If you or someone you know talks about suicide or feeling hopeless, get help right away.   Watch closely for changes in your health, and be sure to contact your doctor if:    · You have anxiety or fear that affects your life.     · You have symptoms of anxiety that are new or different from those you had before. Where can you learn more? Go to https://Logisticare.[employer]. org and sign in to your [email or web address] account. Enter P754 in the [employer] box to learn more about \"Anxiety Disorder: Care Instructions. \"     If you do not have an account, please click on the \"Sign Up Now\" link. Current as of: December 7, 2017  Content Version: 11.8  © 7264-0436 [employer]. Care instructions adapted under license by [facility] ([facility]). If you have questions about a medical condition or this instruction, always ask your healthcare professional. [place] [address] any warranty or liability for your use of this information. Patient Education        Panic Attacks: Care Instructions  Your Care Instructions    During a panic attack, you may have a feeling of intense fear or terror, trouble breathing, chest pain or tightness, heartbeat changes, dizziness, sweating, and shaking.  A panic attack starts suddenly and usually lasts from 5 to 20 minutes but may last even longer. You have the most anxiety about 10 minutes after the attack starts. An attack can begin with a stressful event, or it can happen without a cause. Although panic attacks can cause scary symptoms, you can learn to manage them with self-care, counseling, and medicine. Follow-up care is a key part of your treatment and safety. Be sure to make and go to all appointments, and call your doctor if you are having problems. It's also a good idea to know your test results and keep a list of the medicines you take. How can you care for yourself at home? · Take your medicine exactly as directed. Call your doctor if you think you are having a problem with your medicine. · Go to your counseling sessions and follow-up appointments. · Recognize and accept your anxiety. Then, when you are in a situation that makes you anxious, say to yourself, \"This is not an emergency. I feel uncomfortable, but I am not in danger. I can keep going even if I feel anxious. \"  · Be kind to your body:  ? Relieve tension with exercise or a massage. ? Get enough rest.  ? Avoid alcohol, caffeine, nicotine, and illegal drugs. They can increase your anxiety level, cause sleep problems, or trigger a panic attack. ? Learn and do relaxation techniques. See below for more about these techniques. · Engage your mind. Get out and do something you enjoy. Go to a funny movie, or take a walk or hike. Plan your day. Having too much or too little to do can make you anxious. · Keep a record of your symptoms. Discuss your fears with a good friend or family member, or join a support group for people with similar problems. Talking to others sometimes relieves stress. · Get involved in social groups, or volunteer to help others. Being alone sometimes makes things seem worse than they are. · Get at least 30 minutes of exercise on most days of the week to relieve stress. Walking is a good choice.  You also may want to do other activities, such as running, swimming, cycling, or playing tennis or team sports. Relaxation techniques  Do relaxation exercises for 10 to 20 minutes a day. You can play soothing, relaxing music while you do them, if you wish. · Tell others in your house that you are going to do your relaxation exercises. Ask them not to disturb you. · Find a comfortable place, away from all distractions and noise. · Lie down on your back, or sit with your back straight. · Focus on your breathing. Make it slow and steady. · Breathe in through your nose. Breathe out through either your nose or mouth. · Breathe deeply, filling up the area between your navel and your rib cage. Breathe so that your belly goes up and down. · Do not hold your breath. · Breathe like this for 5 to 10 minutes. Notice the feeling of calmness throughout your whole body. As you continue to breathe slowly and deeply, relax by doing the following for another 5 to 10 minutes:  · Tighten and relax each muscle group in your body. You can begin at your toes and work your way up to your head. · Imagine your muscle groups relaxing and becoming heavy. · Empty your mind of all thoughts. · Let yourself relax more and more deeply. · Become aware of the state of calmness that surrounds you. · When your relaxation time is over, you can bring yourself back to alertness by moving your fingers and toes and then your hands and feet and then stretching and moving your entire body. Sometimes people fall asleep during relaxation, but they usually wake up shortly afterward. · Always give yourself time to return to full alertness before you drive a car or do anything that might cause an accident if you are not fully alert. Never play a relaxation tape while driving a car. When should you call for help? Call 911 anytime you think you may need emergency care.  For example, call if:    · You feel you cannot stop from hurting yourself or someone else.    Watch closely for changes in your health, and be sure to contact your doctor if:    · Your panic attacks get worse.     · You have new or different anxiety.     · You are not getting better as expected. Where can you learn more? Go to https://Picaboopepiceweb.[employer]. org and sign in to your [employer] account. Enter H601 in the [employer] box to learn more about \"Panic Attacks: Care Instructions. \"     If you do not have an account, please click on the \"Sign Up Now\" link. Current as of: December 7, 2017  Content Version: 11.8  © 6191-8758 [employer]. Care instructions adapted under license by [facility] ([facility]). If you have questions about a medical condition or this instruction, always ask your healthcare professional. [address] any warranty or liability for your use of this information.

## 2018-12-19 NOTE — PROGRESS NOTES
oriented to person, place, and time. He has normal strength. GCS eye subscore is 4. GCS verbal subscore is 5. GCS motor subscore is 6. Skin: Skin is warm, dry and intact. Capillary refill takes less than 2 seconds. No rash noted. Psychiatric: His speech is normal and behavior is normal. Judgment and thought content normal. His mood appears anxious. He is not actively hallucinating. Cognition and memory are normal. He is attentive. No results found for requested labs within last 30 days. Lab Results   Component Value Date    WBC 10.5 02/17/2015    NEUTROABS 5.6 02/17/2015    HGB 16.4 02/17/2015    HCT 46.2 02/17/2015    MCV 87.3 02/17/2015     02/17/2015     Lab Results   Component Value Date    TSH 3.12 12/02/2013       ASSESSMENT/PLAN:     Flaco was seen today for anxiety. Diagnoses and all orders for this visit:    Essential hypertension  -     CBC Auto Differential; Future  -     Comprehensive Metabolic Panel; Future  -     TSH with Reflex; Future  -     Lipid Panel; Future    Fatigue, unspecified type  -     Vitamin D 25 Hydroxy; Future  -     Vitamin B12 & Folate; Future  -     Hemoglobin A1C; Future  -     Testosterone, free, total; Future    ARELY (generalized anxiety disorder)  -     DRUG SCREEN MULTI URINE; Future  -     POC Urine with Microscopic    Gout of foot, unspecified cause, unspecified chronicity, unspecified laterality  -     URIC ACID; Future    Screening for diabetes mellitus (DM)  -     Hemoglobin A1C; Future    Encounter for vitamin deficiency screening  -     Vitamin D 25 Hydroxy; Future    Screening for HIV (human immunodeficiency virus)  -     HIV-1 AND HIV-2 ANTIBODIES; Future      Medications Discontinued During This Encounter   Medication Reason    colchicine (COLCRYS) 0.6 MG tablet LIST CLEANUP       Return in about 1 week (around 12/26/2018).   Vivek Mercado, HODA - CNP

## 2018-12-20 LAB
HIV AG/AB: NORMAL
HIV ANTIGEN: NORMAL
HIV-1 ANTIBODY: NORMAL
HIV-2 AB: NORMAL

## 2018-12-21 LAB
SEX HORMONE BINDING GLOBULIN: 20 NMOL/L (ref 11–80)
TESTOSTERONE FREE-NONMALE: 83.7 PG/ML (ref 47–244)
TESTOSTERONE TOTAL: 326 NG/DL (ref 220–1000)

## 2018-12-26 ENCOUNTER — OFFICE VISIT (OUTPATIENT)
Dept: FAMILY MEDICINE CLINIC | Age: 33
End: 2018-12-26
Payer: MEDICAID

## 2018-12-26 VITALS
SYSTOLIC BLOOD PRESSURE: 139 MMHG | OXYGEN SATURATION: 97 % | HEIGHT: 73 IN | DIASTOLIC BLOOD PRESSURE: 80 MMHG | HEART RATE: 76 BPM | RESPIRATION RATE: 16 BRPM | BODY MASS INDEX: 33.27 KG/M2 | WEIGHT: 251 LBS

## 2018-12-26 DIAGNOSIS — R31.29 OTHER MICROSCOPIC HEMATURIA: ICD-10-CM

## 2018-12-26 DIAGNOSIS — F41.1 GAD (GENERALIZED ANXIETY DISORDER): Primary | ICD-10-CM

## 2018-12-26 DIAGNOSIS — E55.9 VITAMIN D DEFICIENCY: ICD-10-CM

## 2018-12-26 DIAGNOSIS — I10 HYPERTENSION, UNSPECIFIED TYPE: ICD-10-CM

## 2018-12-26 DIAGNOSIS — M19.90 ARTHRITIS: ICD-10-CM

## 2018-12-26 LAB
BILIRUBIN, POC: NEGATIVE
BLOOD URINE, POC: NEGATIVE
CLARITY, POC: CLEAR
COLOR, POC: YELLOW
GLUCOSE URINE, POC: NEGATIVE
KETONES, POC: NEGATIVE
LEUKOCYTE EST, POC: NEGATIVE
NITRITE, POC: NEGATIVE
PH, POC: 6
PROTEIN, POC: NEGATIVE
SPECIFIC GRAVITY, POC: 1.02
UROBILINOGEN, POC: 0.2

## 2018-12-26 PROCEDURE — 81002 URINALYSIS NONAUTO W/O SCOPE: CPT | Performed by: NURSE PRACTITIONER

## 2018-12-26 PROCEDURE — 99214 OFFICE O/P EST MOD 30 MIN: CPT | Performed by: NURSE PRACTITIONER

## 2018-12-26 RX ORDER — LISINOPRIL 10 MG/1
10 TABLET ORAL DAILY
Qty: 30 TABLET | Refills: 3 | Status: SHIPPED | OUTPATIENT
Start: 2018-12-26 | End: 2019-07-19 | Stop reason: SDUPTHER

## 2018-12-26 RX ORDER — SENNOSIDES 8.6 MG
650 CAPSULE ORAL EVERY 8 HOURS PRN
Qty: 60 TABLET | Refills: 3 | Status: SHIPPED | OUTPATIENT
Start: 2018-12-26 | End: 2019-04-27

## 2018-12-26 RX ORDER — ESCITALOPRAM OXALATE 10 MG/1
10 TABLET ORAL DAILY
Qty: 30 TABLET | Refills: 3 | Status: SHIPPED | OUTPATIENT
Start: 2018-12-26 | End: 2019-07-19 | Stop reason: SDUPTHER

## 2018-12-26 RX ORDER — HYDROXYZINE PAMOATE 25 MG/1
25 CAPSULE ORAL 3 TIMES DAILY PRN
Qty: 90 CAPSULE | Refills: 2 | Status: SHIPPED | OUTPATIENT
Start: 2018-12-26 | End: 2018-12-26

## 2018-12-26 RX ORDER — HYDROXYZINE PAMOATE 25 MG/1
25 CAPSULE ORAL 3 TIMES DAILY PRN
Qty: 90 CAPSULE | Refills: 2 | Status: SHIPPED | OUTPATIENT
Start: 2018-12-26 | End: 2022-04-16

## 2018-12-26 RX ORDER — ERGOCALCIFEROL 1.25 MG/1
50000 CAPSULE ORAL WEEKLY
Qty: 12 CAPSULE | Refills: 1 | Status: SHIPPED | OUTPATIENT
Start: 2018-12-26

## 2018-12-26 ASSESSMENT — ENCOUNTER SYMPTOMS
BLURRED VISION: 0
ORTHOPNEA: 0
COUGH: 0
SHORTNESS OF BREATH: 0
TROUBLE SWALLOWING: 0
NAUSEA: 0
CHEST TIGHTNESS: 0
EYE PAIN: 0
SORE THROAT: 0
DIARRHEA: 0
ABDOMINAL PAIN: 0
VOMITING: 0
EYE REDNESS: 0

## 2018-12-26 NOTE — PROGRESS NOTES
SUBJECTIVE:    Patient ID: Funmi James is a 28 y.o. male. Chief Complaint   Patient presents with    Hypertension     1 week follow up       Anxiety: Patient complains of evaluation of anxiety disorder and panic attacks. He has the following anxiety symptoms: difficulty concentrating, fatigue, insomnia, palpitations. Onset of symptoms was approximately several years ago, stable since that time. He denies current suicidal and homicidal ideation. Family history significant for anxiety. Possible organic causes contributing are: none. Risk factors: positive family history in  Mother. Previous treatment includes benzodiazepines and Celexa and none. He complains of the following side effects from the treatment: none. Patient reports that his shot several different medications for controlling his anxiety and panic attacks. Patient was on Celexa for several years which did help with his anxiety and panic attacks but it didn't cause him to gain an excessive amount of weight. Patient is here today for one-week follow-up and to discuss recent lab results, and to initiate medication therapy for controlling his anxiety and panic attacks. Patient denies any chest pain, shortness of breath, abdominal pain, or changes in his bowel or bladder patterns. Patient appears generally well, does not have any signs or symptoms of acute distress. Patient reports that he has had anxiety, and panic attacks since he's been about 17. Patient reports his mother has also had the same issues and is unable to drive due to her anxiety and panic attacks. Hypertension   This is a chronic problem. The current episode started more than 1 year ago. The problem has been waxing and waning since onset. The problem is controlled. Associated symptoms include anxiety and malaise/fatigue.  Pertinent negatives include no blurred vision, chest pain, headaches, neck pain, orthopnea, palpitations, peripheral edema, PND, shortness of breath or sweats. Agents associated with hypertension include NSAIDs. Risk factors for coronary artery disease include family history, sedentary lifestyle, smoking/tobacco exposure and stress. Past treatments include beta blockers. The current treatment provides mild improvement. Compliance problems include diet and exercise. There is no history of angina, kidney disease, CAD/MI, CVA, heart failure, left ventricular hypertrophy, PVD or retinopathy. Patient's medications, allergies, past medical, surgical, social and family histories were reviewed and updated as appropriate. Current Outpatient Prescriptions on File Prior to Visit   Medication Sig Dispense Refill    omeprazole (PRILOSEC) 40 MG delayed release capsule Take 40 mg by mouth       No current facility-administered medications on file prior to visit. Review of Systems   Constitutional: Positive for malaise/fatigue. Negative for chills and fever. HENT: Negative for congestion, ear pain, nosebleeds, sore throat and trouble swallowing. Eyes: Negative for blurred vision, pain and redness. Respiratory: Negative for cough, chest tightness and shortness of breath. Cardiovascular: Negative for chest pain, palpitations, orthopnea, leg swelling and PND. Gastrointestinal: Negative for abdominal pain, diarrhea, nausea and vomiting. Genitourinary: Negative for difficulty urinating, dysuria and flank pain. Musculoskeletal: Negative for arthralgias, gait problem and neck pain. Skin: Negative for pallor and rash. Neurological: Negative for dizziness, numbness and headaches. Psychiatric/Behavioral: Positive for agitation and sleep disturbance. The patient is nervous/anxious. OBJECTIVE:  /80   Pulse 76   Resp 16   Ht 6' 1\" (1.854 m)   Wt 251 lb (113.9 kg)   SpO2 97%   BMI 33.12 kg/m²       Physical Exam   Constitutional: He is oriented to person, place, and time. He appears well-developed and well-nourished.    HENT:   Head: Normocephalic. Eyes: Pupils are equal, round, and reactive to light. Conjunctivae and EOM are normal.   Neck: Normal range of motion. Neck supple. Cardiovascular: Normal rate, regular rhythm and normal heart sounds. Pulmonary/Chest: Effort normal and breath sounds normal. No respiratory distress. Abdominal: Soft. Bowel sounds are normal. There is no tenderness. Musculoskeletal: Normal range of motion. Lymphadenopathy:     He has no cervical adenopathy. Neurological: He is alert and oriented to person, place, and time. Skin: Skin is warm and dry. Capillary refill takes less than 2 seconds. No rash noted. Psychiatric: He has a normal mood and affect.  His behavior is normal. Judgment and thought content normal.     Results in Past 30 Days  Result Component Current Result Ref Range Previous Result Ref Range   Alb 4.7 (12/19/2018) 3.4 - 4.8 g/dL Not in Time Range    Albumin/Globulin Ratio 2.2 (H) (12/19/2018) 0.8 - 2.0 Not in Time Range    Alkaline Phosphatase 54 (12/19/2018) 25 - 100 U/L Not in Time Range    ALT 21 (12/19/2018) 4 - 36 U/L Not in Time Range    AST 18 (12/19/2018) 8 - 33 U/L Not in Time Range    BUN 10 (12/19/2018) 6 - 20 mg/dL Not in Time Range    Calcium 9.8 (12/19/2018) 8.5 - 10.5 mg/dL Not in Time Range    Chloride 103 (12/19/2018) 98 - 107 mmol/L Not in Time Range    CO2 28 (12/19/2018) 20 - 30 mmol/L Not in Time Range    CREATININE 1.0 (12/19/2018) 0.4 - 1.2 mg/dL Not in Time Range    GFR  >59 (12/19/2018) >59 Not in Time Range    GFR Non- >59 (12/19/2018) >59 Not in Time Range    Globulin 2.1 (12/19/2018) g/dL Not in Time Range    Glucose 89 (12/19/2018) 74 - 106 mg/dL Not in Time Range    Potassium 4.1 (12/19/2018) 3.4 - 5.1 mmol/L Not in Time Range    Sodium 142 (12/19/2018) 136 - 145 mmol/L Not in Time Range    Total Bilirubin 0.7 (12/19/2018) 0.3 - 1.2 mg/dL Not in Time Range    Total Protein 6.8 (12/19/2018) 6.4 - 8.3 g/dL Not in Time Range

## 2018-12-26 NOTE — PATIENT INSTRUCTIONS
you make changes to your diet. Store at room temperature away from moisture, heat, and light. What happens if I miss a dose? Take the missed dose as soon as you remember. Skip the missed dose if it is almost time for your next scheduled dose. Do not take extra medicine to make up the missed dose. What happens if I overdose? Seek emergency medical attention or call the Poison Help line at 1-742.462.1115. An overdose of vitamin D can cause serious or life-threatening side effects. Overdose symptoms may include nausea, loss of appetite, thirst, urinating more or less than usual, body aches, stiffness, confusion, or irregular heartbeats. What should I avoid while taking ergocalciferol? Ask a doctor or pharmacist before taking any multivitamins, mineral supplements, or antacids while you are taking ergocalciferol. What are the possible side effects of ergocalciferol? Get emergency medical help if you have signs of an allergic reaction: hives; difficult breathing; swelling of your face, lips, tongue, or throat. Stop taking this medicine and call your doctor at once if you have:  · chest pain, feeling short of breath;  · growth problems (in a child taking ergocalciferol); or  · early signs of vitamin D overdose --weakness, metallic taste in your mouth, weight loss, muscle or bone pain, constipation, nausea, and vomiting. Less serious side effects may be more likely, and you may have none at all. This is not a complete list of side effects and others may occur. Call your doctor for medical advice about side effects. You may report side effects to FDA at 9-325-CPD-2010. What other drugs will affect ergocalciferol? Certain medications can make it harder for your body to absorb vitamin D. If you take other medications, take them at least 2 hours before or 2 hours after you take ergocalciferol.   Tell your doctor about all your current medicines and any you start or stop using, especially:  · a diuretic or \"water

## 2019-04-27 ENCOUNTER — HOSPITAL ENCOUNTER (EMERGENCY)
Facility: HOSPITAL | Age: 34
Discharge: HOME | End: 2019-04-27
Attending: HOSPITALIST
Payer: MEDICAID

## 2019-04-27 VITALS
TEMPERATURE: 98.4 F | SYSTOLIC BLOOD PRESSURE: 128 MMHG | RESPIRATION RATE: 16 BRPM | DIASTOLIC BLOOD PRESSURE: 81 MMHG | OXYGEN SATURATION: 98 % | HEART RATE: 109 BPM

## 2019-04-27 DIAGNOSIS — M10.071 IDIOPATHIC GOUT OF RIGHT ANKLE, UNSPECIFIED CHRONICITY: Primary | ICD-10-CM

## 2019-04-27 PROCEDURE — 99282 EMERGENCY DEPT VISIT SF MDM: CPT

## 2019-04-27 PROCEDURE — 6370000000 HC RX 637 (ALT 250 FOR IP): Performed by: HOSPITALIST

## 2019-04-27 RX ORDER — INDOMETHACIN 50 MG/1
50 CAPSULE ORAL
Qty: 21 CAPSULE | Refills: 0 | Status: SHIPPED | OUTPATIENT
Start: 2019-04-27 | End: 2022-04-16 | Stop reason: ALTCHOICE

## 2019-04-27 RX ORDER — INDOMETHACIN 25 MG/1
50 CAPSULE ORAL ONCE
Status: COMPLETED | OUTPATIENT
Start: 2019-04-27 | End: 2019-04-27

## 2019-04-27 RX ORDER — HYDROCODONE BITARTRATE AND ACETAMINOPHEN 5; 325 MG/1; MG/1
1 TABLET ORAL EVERY 6 HOURS PRN
Qty: 12 TABLET | Refills: 0 | Status: SHIPPED | OUTPATIENT
Start: 2019-04-27 | End: 2019-04-30

## 2019-04-27 RX ADMIN — INDOMETHACIN 50 MG: 25 CAPSULE ORAL at 18:10

## 2019-04-27 ASSESSMENT — PAIN DESCRIPTION - LOCATION
LOCATION: ANKLE
LOCATION: ANKLE

## 2019-04-27 ASSESSMENT — PAIN SCALES - GENERAL
PAINLEVEL_OUTOF10: 5
PAINLEVEL_OUTOF10: 6
PAINLEVEL_OUTOF10: 4

## 2019-04-27 ASSESSMENT — PAIN DESCRIPTION - FREQUENCY
FREQUENCY: INTERMITTENT
FREQUENCY: CONTINUOUS

## 2019-04-27 ASSESSMENT — PAIN DESCRIPTION - PAIN TYPE: TYPE: ACUTE PAIN

## 2019-04-27 ASSESSMENT — PAIN DESCRIPTION - ORIENTATION
ORIENTATION: RIGHT
ORIENTATION: RIGHT

## 2019-04-27 ASSESSMENT — PAIN DESCRIPTION - DESCRIPTORS: DESCRIPTORS: ACHING;STABBING

## 2019-04-27 NOTE — ED PROVIDER NOTES
[address] [facility] ENCOUNTER      Pt Name: Omar Thompson  MRN: [id number]  YOB: 1985  Date of evaluation: 4/27/2019  Provider: Gerson Smith, [facility]       Chief Complaint   Patient presents with    Ankle Pain         HISTORY OF PRESENT ILLNESS  (Location/Symptom, Timing/Onset, Context/Setting, Quality, Duration, Modifying Factors, Severity.)   Omar Thompson is a 28 y.o. male who presents to the emergency department for ankle pain. Patient here today complaining of right ankle pain. Patient states the pain started last night or early this morning. He does have a history of gout. Patient states he just got over a flare of gout in his foot and actually feels like it is moved his ankle. Patient points to the lateral aspect of his ankle when asked where it hurts the most. Patient states he cannot tolerate anything actually touching it very well. He is on uloric prophylactically for his gout. She states he also used to be on a chronic meloxicam at one time but they took him off of that medication because it was \"hard on his kidneys\". Patient denies any injury or trauma to the ankle. Denies any numbness tingling or weakness of extremity ordinary. Nursing notes were reviewed. REVIEW OFSYSTEMS    (2-9 systems for level 4, 10 or more for level 5)   ROS:  General:  No fevers, no chills, no weakness  Cardiovascular:  No chest pain, no palpitations  Respiratory:  No shortness of breath, no cough, no wheezing  Gastrointestinal:  No pain, no nausea, no vomiting, no diarrhea  Musculoskeletal:  No muscle pain, +right ankle pain  Skin:  No rash, no easy bruising  Neurologic:  No speech problems, no headache, no extremity weakness  Psychiatric:  No anxiety  Genitourinary:  No dysuria, no hematuria    Except as noted above the remainder of the review of systems was reviewed and negative.        PAST MEDICAL HISTORY     Past Medical History: Diagnosis Date    Gout     Hypertension          SURGICAL HISTORY       Past Surgical History:   Procedure Laterality Date    TONSILLECTOMY           CURRENT MEDICATIONS       Previous Medications    ESCITALOPRAM (LEXAPRO) 10 MG TABLET    Take 1 tablet by mouth daily    HYDROXYZINE (VISTARIL) 25 MG CAPSULE    Take 1 capsule by mouth 3 times daily as needed for Anxiety    LISINOPRIL (PRINIVIL;ZESTRIL) 10 MG TABLET    Take 1 tablet by mouth daily    VITAMIN D (ERGOCALCIFEROL) 03616 UNITS CAPS CAPSULE    Take 1 capsule by mouth once a week       ALLERGIES     Patient has no known allergies. FAMILY HISTORY     History reviewed. No pertinent family history.        SOCIAL HISTORY       Social History     Socioeconomic History    Marital status:      Spouse name: None    Number of children: None    Years of education: None    Highest education level: None   Occupational History    None   Social Needs    Financial resource strain: None    Food insecurity:     Worry: None     Inability: None    Transportation needs:     Medical: None     Non-medical: None   Tobacco Use    Smoking status: Never Smoker    Smokeless tobacco: Current User     Types: Snuff   Substance and Sexual Activity    Alcohol use: No    Drug use: Never    Sexual activity: Not Currently   Lifestyle    Physical activity:     Days per week: None     Minutes per session: None    Stress: None   Relationships    Social connections:     Talks on phone: None     Gets together: None     Attends Religion service: None     Active member of club or organization: None     Attends meetings of clubs or organizations: None     Relationship status: None    Intimate partner violence:     Fear of current or ex partner: None     Emotionally abused: None     Physically abused: None     Forced sexual activity: None   Other Topics Concern    None   Social History Narrative    None         PHYSICAL EXAM    (up to 7 for level 4, 8 or more for level 5)     ED Triage Vitals   BP Temp Temp src Pulse Resp SpO2 Height Weight   -- -- -- -- -- -- -- --       Physical Exam  General :Patient is awake, alert, oriented, in no acute distress, nontoxic appearing  HEENT: Pupils are equally round and reactive to light, EOMI, conjunctivae clear. Oral mucosa is moist, no exudate. Uvula is midline  Cardiac: Heart regular rate, rhythm, no murmurs, rubs, or gallops  Lungs: Lungs are clear to auscultation, there is no wheezing, rhonchi, or rales. There is no use of accessory muscles. Abdomen: Abdomen is soft, nontender, nondistended. There is no firm or pulsatile masses, no rebound rigidity or guarding. Musculoskeletal: 5 out of 5 strength in all 4 extremities. No focal muscle deficits are appreciated, no erythema noted to the lateral aspect of the ankle over the posterior shoulder of the lateral malleolus. There is very tender to touch patient is not tolerated at all. Neuro: Motor intact, sensory intact, level of consciousness is normal  Dermatology: Skin is warm and dry  Psych: Mentation is grossly normal, cognition is grossly normal. Affect is appropriate. DIAGNOSTIC RESULTS     EKG: All EKG's are interpreted by the Emergency Department Physician who either signs or Co-signs this chart in the [address] a cardiologist.        RADIOLOGY:   Non-plain film images such as CT, Ultrasound and MRI are read by the radiologist. Plain radiographic images are visualized and preliminarily interpreted by the emergency physician with the below findings:      ? Radiologist's Report Reviewed:  No orders to display         ED BEDSIDE ULTRASOUND:   Performed by ED Physician - none    LABS:    I have reviewed and interpreted all of the currently available lab results from this visit (ifapplicable):  No results found for this visit on 04/27/19. All other labs were within normal range or not returned as of this dictation.     EMERGENCY DEPARTMENT COURSE and DIFFERENTIAL DIAGNOSIS/MDM: Vitals:    Vitals:    04/27/19 1750   BP: 128/81   Pulse: 109   Resp: 16   Temp: 98.4 °F (36.9 °C)   TempSrc: Oral   SpO2: 98%       MEDICATIONS ADMINISTERED IN ED:  Medications   indomethacin (INDOCIN) capsule 50 mg (50 mg Oral Given 4/27/19 1810)       After initial evaluation and examination I did have conversation with the patient about the upcoming plan, treatment and possible disposition to the time of this dictation. Advised we will give him a dose of Indocin here for his a gout. We'll also write a prescription for narcotic pain medication that he is driving himself so we cannot provide him with a dose of that here. Patient advised to stop the Norco while he has acute flare the gout advised to restart that medication once this acute flare is over. Patient be discharged home in stable condition. The patient will follow-up with their PCP in 1-2 days for reevaluation. Patient advised if symptoms worsens or new symptoms arise he should return back to emergency department for further evaluation workup. CONSULTS:  None    PROCEDURES:  Procedures    CRITICAL CARE TIME    Total Critical Care time was 0 minutes, excluding separately reportable procedures. There was a high probability of clinically significant/life threatening deterioration in the patient's condition which required my urgent intervention. FINAL IMPRESSION      1. Idiopathic gout of right ankle, unspecified chronicity          DISPOSITION/PLAN   DISPOSITION        PATIENT REFERRED TO:  HODA Brown - CNP  [facility]  [phone]    In 2 days      [facility] Emergency Department  [facility] [address] [facility]  [phone]    As needed, If symptoms worsen      DISCHARGE MEDICATIONS:  New Prescriptions    HYDROCODONE-ACETAMINOPHEN (LORTAB) 5-325 MG PER TABLET    Take 1 tablet by mouth every 6 hours as needed for Pain for up to 3 days.     INDOMETHACIN (INDOCIN) 50 MG CAPSULE    Take 1

## 2019-04-27 NOTE — ED TRIAGE NOTES
Pt was diagnosed with gout approximately 10 years ago. He is currently suffering a flare up in his right ankle. Pt states the pain is stabbing and achy.

## 2019-07-06 ENCOUNTER — OFFICE VISIT (OUTPATIENT)
Dept: FAMILY MEDICINE CLINIC | Age: 34
End: 2019-07-06
Payer: MEDICAID

## 2019-07-06 VITALS
OXYGEN SATURATION: 96 % | RESPIRATION RATE: 18 BRPM | HEART RATE: 85 BPM | WEIGHT: 250 LBS | DIASTOLIC BLOOD PRESSURE: 70 MMHG | BODY MASS INDEX: 33.13 KG/M2 | HEIGHT: 73 IN | SYSTOLIC BLOOD PRESSURE: 115 MMHG

## 2019-07-06 DIAGNOSIS — S86.011A STRAIN OF RIGHT ACHILLES TENDON, INITIAL ENCOUNTER: Primary | ICD-10-CM

## 2019-07-06 PROCEDURE — 99213 OFFICE O/P EST LOW 20 MIN: CPT | Performed by: NURSE PRACTITIONER

## 2019-07-06 RX ORDER — NAPROXEN 500 MG/1
500 TABLET ORAL 2 TIMES DAILY WITH MEALS
Qty: 60 TABLET | Refills: 0 | Status: SHIPPED | OUTPATIENT
Start: 2019-07-06 | End: 2022-04-16

## 2019-07-06 RX ORDER — KETOROLAC TROMETHAMINE 30 MG/ML
60 INJECTION, SOLUTION INTRAMUSCULAR; INTRAVENOUS ONCE
Status: COMPLETED | OUTPATIENT
Start: 2019-07-06 | End: 2019-07-06

## 2019-07-06 RX ORDER — PREDNISONE 20 MG/1
TABLET ORAL
Qty: 14 TABLET | Refills: 0 | Status: SHIPPED | OUTPATIENT
Start: 2019-07-06 | End: 2022-04-16

## 2019-07-06 RX ORDER — CYCLOBENZAPRINE HCL 10 MG
10 TABLET ORAL 3 TIMES DAILY PRN
Qty: 30 TABLET | Refills: 0 | Status: SHIPPED | OUTPATIENT
Start: 2019-07-06 | End: 2019-07-16

## 2019-07-06 RX ORDER — SILDENAFIL 100 MG/1
10 TABLET, FILM COATED ORAL PRN
Refills: 0 | COMMUNITY
Start: 2019-06-27 | End: 2022-04-16

## 2019-07-06 RX ADMIN — KETOROLAC TROMETHAMINE 60 MG: 30 INJECTION, SOLUTION INTRAMUSCULAR; INTRAVENOUS at 10:46

## 2019-07-06 ASSESSMENT — ENCOUNTER SYMPTOMS
RESPIRATORY NEGATIVE: 1
EYES NEGATIVE: 1
GASTROINTESTINAL NEGATIVE: 1

## 2019-07-06 ASSESSMENT — PATIENT HEALTH QUESTIONNAIRE - PHQ9
SUM OF ALL RESPONSES TO PHQ9 QUESTIONS 1 & 2: 2
SUM OF ALL RESPONSES TO PHQ QUESTIONS 1-9: 2
2. FEELING DOWN, DEPRESSED OR HOPELESS: 1
1. LITTLE INTEREST OR PLEASURE IN DOING THINGS: 1
SUM OF ALL RESPONSES TO PHQ QUESTIONS 1-9: 2

## 2019-07-06 NOTE — PROGRESS NOTES
Skin is warm and dry. Psychiatric: He has a normal mood and affect. His behavior is normal. Judgment and thought content normal.   Nursing note and vitals reviewed. ASSESSMENT/PLAN:  1. Right achilles tendon strain- prednisone 20 mg tab take 2 po daily with food. Naproxen 500 mg po bid X 10 days. Flexeril 10 mg po tid prn spasms. Do not take flexeril and drive, it will make you sleepy. Toradol 60 mg IM today in office. Rest, ice, compression and elevation for the next 10 days. 2. Return to clinic if symptoms do not improve. An  electronic signature was used to authenticate this note.     --Luis Villafana, HODA - CNP on 7/6/2019 at 10:28 AM

## 2019-07-19 DIAGNOSIS — I10 HYPERTENSION, UNSPECIFIED TYPE: ICD-10-CM

## 2019-07-19 DIAGNOSIS — F41.1 GAD (GENERALIZED ANXIETY DISORDER): ICD-10-CM

## 2019-07-19 RX ORDER — LISINOPRIL 10 MG/1
10 TABLET ORAL DAILY
Qty: 30 TABLET | Refills: 0 | Status: SHIPPED | OUTPATIENT
Start: 2019-07-19 | End: 2022-04-16

## 2019-07-19 RX ORDER — ESCITALOPRAM OXALATE 10 MG/1
10 TABLET ORAL DAILY
Qty: 30 TABLET | Refills: 0 | Status: SHIPPED | OUTPATIENT
Start: 2019-07-19 | End: 2022-04-16

## 2022-04-16 ENCOUNTER — HOSPITAL ENCOUNTER (EMERGENCY)
Facility: HOSPITAL | Age: 37
Discharge: HOME | End: 2022-04-16
Attending: HOSPITALIST
Payer: MEDICAID

## 2022-04-16 VITALS
OXYGEN SATURATION: 100 % | RESPIRATION RATE: 16 BRPM | DIASTOLIC BLOOD PRESSURE: 94 MMHG | HEART RATE: 74 BPM | TEMPERATURE: 98.5 F | WEIGHT: 250 LBS | HEIGHT: 72 IN | SYSTOLIC BLOOD PRESSURE: 127 MMHG | BODY MASS INDEX: 33.86 KG/M2

## 2022-04-16 DIAGNOSIS — M10.061 ACUTE IDIOPATHIC GOUT OF RIGHT KNEE: Primary | ICD-10-CM

## 2022-04-16 PROCEDURE — 99283 EMERGENCY DEPT VISIT LOW MDM: CPT

## 2022-04-16 RX ORDER — ALLOPURINOL 300 MG/1
150 TABLET ORAL DAILY
COMMUNITY

## 2022-04-16 RX ORDER — INDOMETHACIN 25 MG/1
25 CAPSULE ORAL
Qty: 21 CAPSULE | Refills: 0 | Status: SHIPPED | OUTPATIENT
Start: 2022-04-16 | End: 2022-04-23

## 2022-04-16 ASSESSMENT — PAIN DESCRIPTION - LOCATION: LOCATION: KNEE

## 2022-04-16 ASSESSMENT — PAIN DESCRIPTION - ONSET: ONSET: AWAKENED FROM SLEEP

## 2022-04-16 ASSESSMENT — PAIN SCALES - GENERAL: PAINLEVEL_OUTOF10: 5

## 2022-04-16 ASSESSMENT — PAIN DESCRIPTION - FREQUENCY: FREQUENCY: CONTINUOUS

## 2022-04-16 ASSESSMENT — PAIN DESCRIPTION - PROGRESSION: CLINICAL_PROGRESSION: NOT CHANGED

## 2022-04-16 ASSESSMENT — PAIN DESCRIPTION - ORIENTATION: ORIENTATION: RIGHT

## 2022-04-16 NOTE — ED NOTES
AVS and Rx reviewed with patient, understanding verbalized. Patient discharged home with no further needs or concerns voiced. pcp follow up recommended.      Carmen Carrillo RN  04/16/22 3053

## 2022-04-16 NOTE — ED TRIAGE NOTES
Pt to ED with complaints of right knee pain that started last night. Pt reports he has a history of gout.

## 2022-04-16 NOTE — ED PROVIDER NOTES
[address] [facility] ENCOUNTER      Pt Name: Rafael Jones  MRN: [id number]  YOB: 1985  Date of evaluation: 4/16/2022  Provider: Bobby Mi, [address] [place] [address]       Chief Complaint   Patient presents with    Knee Pain         HISTORY OF PRESENT ILLNESS  (Location/Symptom, Timing/Onset, Context/Setting, Quality, Duration, Modifying Factors, Severity.)   Rafael Jones is a 39 y.o. male who presents to the emergency department for right knee pain. Patient states discomfort started around 3:00 this morning woke him up states this is really a constant sharp throbbing type sensation. When asked where it hurts the most he points to the medial aspect of his knee. Patient states it feels like he his usual symptoms he gets when he has his gout. States he is really not had it very often in the knee is usually in his ankle or foot. Patient denies any trauma or injury to the area. Denies any turning twisting of the ordinary. Patient does states he awoke this morning at 3:00 with the pain and was concerned that it could possibly be gout and knowing that the holiday was tomorrow he would not be able to be get any prescriptions that tabs he decided to come to the emergency department for evaluation. He states he does have a family physician which she follows. He uses allopurinol as a maintenance medication for his gout. Denies any chest pain or shortness of breath. Denies any pain to the calf or lower extremity still. Denies any erythema or swelling. Nursing notes were reviewed.     REVIEW OFSYSTEMS    (2-9 systems for level 4, 10 or more for level 5)   ROS:  General:  No fevers, no chills, no weakness  Cardiovascular:  No chest pain, no palpitations  Respiratory:  No shortness of breath, no cough, no wheezing  Gastrointestinal:  No pain, no nausea, no vomiting, no diarrhea  Musculoskeletal:  No muscle pain, +right knee pain  Skin:  No rash, no easy bruising  Neurologic:  No speech problems, no headache, no extremity weakness  Psychiatric:  No anxiety  Genitourinary:  No dysuria, no hematuria    Except as noted above the remainder of the review of systems was reviewed and negative. PAST MEDICAL HISTORY     Past Medical History:   Diagnosis Date    Gout     Hypertension          SURGICAL HISTORY       Past Surgical History:   Procedure Laterality Date    TONSILLECTOMY           CURRENT MEDICATIONS       Previous Medications    ALLOPURINOL (ZYLOPRIM) 300 MG TABLET    Take 150 mg by mouth daily    VITAMIN D (ERGOCALCIFEROL) 19835 UNITS CAPS CAPSULE    Take 1 capsule by mouth once a week       ALLERGIES     Patient has no known allergies. FAMILY HISTORY     History reviewed. No pertinent family history. SOCIAL HISTORY       Social History     Socioeconomic History    Marital status:      Spouse name: None    Number of children: None    Years of education: None    Highest education level: None   Occupational History    None   Tobacco Use    Smoking status: Never Smoker    Smokeless tobacco: Current User     Types: Snuff   Substance and Sexual Activity    Alcohol use: No    Drug use: Never    Sexual activity: Not Currently   Other Topics Concern    None   Social History Narrative    None     Social Determinants of Health     Financial Resource Strain:     Difficulty of Paying Living Expenses: Not on file   Food Insecurity:     Worried About Running Out of Food in the Last Year: Not on file    Garry of Food in the Last Year: Not on file   Transportation Needs:     Lack of Transportation (Medical): Not on file    Lack of Transportation (Non-Medical):  Not on file   Physical Activity:     Days of Exercise per Week: Not on file    Minutes of Exercise per Session: Not on file   Stress:     Feeling of Stress : Not on file   Social Connections:     Frequency of Communication with Friends and Family: Not on file    Frequency of Social Gatherings with Friends and Family: Not on file    Attends [employer] Services: Not on file    Active Member of Clubs or Organizations: Not on file    Attends Club or Organization Meetings: Not on file    Marital Status: Not on file   Intimate Partner Violence:     Fear of Current or Ex-Partner: Not on file    Emotionally Abused: Not on file    Physically Abused: Not on file    Sexually Abused: Not on file   Housing Stability:     Unable to Pay for Housing in the Last Year: Not on file    Number of [place] in the Last Year: Not on file    Unstable Housing in the Last Year: Not on file         PHYSICAL EXAM    (up to 7 for level 4, 8 or more for level 5)     ED Triage Vitals   BP Temp Temp Source Pulse Resp SpO2 Height Weight   04/16/22 1112 04/16/22 1112 04/16/22 1112 04/16/22 1112 04/16/22 1115 04/16/22 1112 04/16/22 1115 04/16/22 1115   (!) 129/102 98.5 °F (36.9 °C) Oral 78 22 99 % 6' (1.829 m) 250 lb (113.4 kg)       Physical Exam  General :Patient is awake, alert, oriented, in no acute distress, nontoxic appearing  HEENT: Pupils are equally round and reactive to light, EOMI, conjunctivae clear. Oral mucosa is moist, no exudate. Uvula is midline  Cardiac: Heart regular rate, rhythm, no murmurs, rubs, or gallops  Lungs: Lungs are clear to auscultation, there is no wheezing, rhonchi, or rales. There is no use of accessory muscles. Musculoskeletal: 5 out of 5 strength in both lower extremities. No focal muscle deficits are appreciated, no obvious swelling or erythema noted. There is no warmth to the knee itself but he does have palpable tenderness over the upper medial aspect of the knee which could be possible early gout before the inflammatory aspect is started since he is on allopurinol prophylactically. Also consistent with a medial collateral ligament injury but no trauma per patient. Neuro:  Motor intact, sensory intact, level of consciousness is normal   Dermatology: Skin is warm and dry  Psych: Mentation is grossly normal, cognition is grossly normal. Affect is appropriate. DIAGNOSTIC RESULTS     EKG: All EKG's are interpreted by the Emergency Department Physician who either signs or Co-signs this chart in the [address] a cardiologist.        RADIOLOGY:   Non-plain film images such as CT, Ultrasound and MRI are read by the radiologist. Plain radiographic images are visualized and preliminarily interpreted by the emergency physician with the below findings:      ? Radiologist's Report Reviewed:  No orders to display         ED BEDSIDE ULTRASOUND:   Performed by ED Physician - none    LABS:    I have reviewed and interpreted all of the currently available lab results from this visit (ifapplicable):  No results found for this visit on 04/16/22. All other labs were within normal range or not returned as of this dictation. EMERGENCY DEPARTMENT COURSE and DIFFERENTIAL DIAGNOSIS/MDM:   Vitals:    Vitals:    04/16/22 1112 04/16/22 1114 04/16/22 1115 04/16/22 1118   BP: (!) 129/102 129/88  (!) 127/94   Pulse: 78   74   Resp:   22 16   Temp: 98.5 °F (36.9 °C)   98.5 °F (36.9 °C)   TempSrc: Oral   Oral   SpO2: 99%   100%   Weight:   250 lb (113.4 kg) 250 lb (113.4 kg)   Height:   6' (1.829 m) 6' (1.829 m)       MEDICATIONS ADMINISTERED IN ED:  Medications - No data to display    After initial evaluation examination I did have a conversation with the patient about the upcoming plan, treatment possible disposition which she was agreeable to the test dictation. Patient advised that based on his physical exam this could be just early gout like he thinks it is but it could also be a injury to the medial collateral ligament of the knee but he has had no trauma or injury that he is aware of. Patient states it does feel like his previous episodes of gout especially for the pain. Patient rates his pain as a 5 out of 10 at this time but he is able to ambulate without any difficulty. Advised that we would treat him based on his initial presentation which she is concerning for gout. Advised that during an acute flare of gout he should not take the allopurinol because it can actually make it worse he should wait till the flare is over and then go back on that medication. We will treat him at this time with the indomethacin 25 to 50 mg 3 times a day with meals. He is agreeable to this plan will probably start with the 25 3 times a day since it can be a little hard on the stomach otherwise patient does not take any other medications on a regular basis again he does state he has a primary care provider to follow-up with. Patient be discharged home in stable condition. Patient advised that if his symptoms do not improve within the next 1 week with the use of the anti-inflammatory medication he may want to be seen by orthopedic surgery which will given the information for a local surgeon to follow-up with. The patient advised that he does need to follow-up with his regular family physician within the next 1 to 2 days for evaluation. He was also given instructions of his symptoms worsens or new symptoms arise he should return back to emergency department for further evaluation work-up. CONSULTS:  None    PROCEDURES:  Procedures    CRITICAL CARE TIME    Total Critical Care time was 0 minutes, excluding separately reportable procedures. There was a high probability of clinically significant/life threatening deterioration in the patient's condition which required my urgent intervention. FINAL IMPRESSION      1. Acute idiopathic gout of right knee          DISPOSITION/PLAN   DISPOSITION Decision To Discharge 04/16/2022 11:38:31 AM      PATIENT REFERRED TO:  Your PCP  1-2 days for reevaluation        [facility] Emergency Department  [facility] [address]   [facility]  [phone]    As needed, If symptoms worsen    Maria Fernanda Burt MD  [address] [facility] [address], [address] [place] [address]  [phone]    Schedule an appointment as soon as possible for a visit in 1 week  If symptoms worsen or does not improve with treatment with the anti-inflammatory medication      DISCHARGE MEDICATIONS:  New Prescriptions    INDOMETHACIN (INDOCIN) 25 MG CAPSULE    Take 1 capsule by mouth 3 times daily (with meals) for 7 days       Comment: Please note this report has been produced using speech recognition software and may contain errorsrelated to that system including errors in grammar, punctuation, and spelling, as well as words and phrases that may be inappropriate. If there are any questions or concerns please feel free to contact the dictating providerfor clarification.     Lisa De La Cruz DO  Attending Emergency Physician              Lisa De La Cruz DO  04/16/22 113